# Patient Record
(demographics unavailable — no encounter records)

---

## 2019-06-04 NOTE — PDOC
BRIEF OPERATIVE NOTE


Pre-Op Diagnosis


TIUP


Labor


Post-Op Diagnosis


Same


FTP


Procedure Performed


Primary LTC/S


Surgeon


Lalito


Anesthesia Type:  Regional


Blood Loss


2L


Findings


Female


8/7/9


9#


Complications


Hemorrhage











DAVID CENTENO MD                Jun 4, 2019 15:39

## 2019-06-04 NOTE — PDOC1
OB - History


Hx of Present Pregnancy


Prenatal Care:  Good Care


Ultrasounds:  Normal mid trimester US


Obstetrical Complications:  None


Medical Complications:  None





Past Family/Social History


*


Past Medical, Surgical, Family and Obstetric Histories reviewed from prenatal 

chart.


Blood Type:  O+


Rubella:  Immune


RPR/VDRL:  Negative


GBS Status:  Negative


HBsAG:  Negative





OB - Chief Complaint & HPI


Date of Admission:


Date of Admission:  2019 at 00:02


Chief Complaint/History


:  3


Para:  2


EDC:  2019


Reason for admission:  active labor


Admission Nurse Assessment Rev:  Yes





OB - Admission Exam


Physical Exam


Vitals:





                          VS - Last 72 Hours, by Label








  Date Time  Temp Pulse Resp B/P (MAP) Pulse Ox O2 Delivery O2 Flow Rate FiO2


 


19 05:53   22   Room Air  


 


19 02:48   18   Room Air  


 


19 02:15 97.7 102 20 127/78 (94) 97 Room Air  





 97.7       








HEENT:  Normal, Nasal Mucosa Normal, Oropharynx Normal, Moist Membranes, 

Fontanelles Normal


Heart:  Regular Rate


Lungs:  Clear, Equal


Abdomen:  Gravid


Extremities:  Normal Pulses, No tenderness or swelling


Reflexes:  Normal


Cervical Dilatation:  3cm


Effacement:  75%


Station:  -2


Membranes:  Intact


Fetal Heart Rate:  Normal


Accelerations:  Accelerations Present


Short Term Variability:  Present


Contractions on Admission:  6-10 Minutes Apart


Intensity:  Moderate





Assessment/Plan


Assessment/Plan


TIUP


Labor


ACS











DAVID CENTENO MD                2019 08:44

## 2019-06-05 NOTE — PDOC
Provider Note


Provider Note


Stable


Hgb 7.7


Dressing CDI


FU in AM


Vital Sign - Last 24 Hours








 6/4/19 6/4/19 6/4/19 6/4/19





 16:42 18:45 20:15 22:13


 


Temp  98.0  98.3





  98.0  98.3


 


Pulse  114  112


 


Resp 18 18 20 18


 


B/P (MAP)  109/72 (84)  98/66 (77)


 


Pulse Ox 97 97  96


 


O2 Delivery Room Air Room Air ETCO2=33 ETCO2=33


 


    





    





 6/5/19 6/5/19 6/5/19 6/5/19





 02:00 05:10 05:12 08:06


 


Temp 98.3 98.6  





 98.3 98.6  


 


Pulse 115 117  


 


Resp 24 18 20 20


 


B/P (MAP) 89/52 (64) 99/59 (72)  


 


Pulse Ox 95 96  98


 


O2 Delivery ETCO2=35 Room Air ETCO2=34 Room Air














Intake and Output   


 


 6/4/19 6/4/19 6/5/19





 15:00 23:00 07:00


 


Intake Total   2420 ml


 


Output Total   450 ml


 


Balance   1970 ml





CBC - BMP


6/4/19 16:20








6/5/19 07:52

















DAVID CENTENO MD                Jun 5, 2019 08:25

## 2019-06-06 NOTE — PDOC
OB Progress Note


Date of Service


6/6/19


Time of Evaluation


1830


Notes


Pt. feeling well.  No complaints.


Lab





Laboratory Tests








Test


 6/5/19


07:52 6/6/19


04:30


 


White Blood Count


 13.3 x10^3/uL


(4.0-11.0) 12.6 x10^3/uL


(4.0-11.0)


 


Red Blood Count


 2.65 x10^6/uL


(3.50-5.40) 2.47 x10^6/uL


(3.50-5.40)


 


Hemoglobin


 7.7 g/dL


(12.0-15.5) 7.3 g/dL


(12.0-15.5)


 


Hematocrit


 22.8 %


(36.0-47.0) 21.4 %


(36.0-47.0)


 


Mean Corpuscular Volume 86 fL ()  87 fL () 


 


Mean Corpuscular Hemoglobin 29 pg (25-35)  29 pg (25-35) 


 


Mean Corpuscular Hemoglobin


Concent 34 g/dL


(31-37) 34 g/dL


(31-37)


 


Red Cell Distribution Width


 16.0 %


(11.5-14.5) 15.9 %


(11.5-14.5)


 


Platelet Count


 184 x10^3/uL


(140-400) 201 x10^3/uL


(140-400)


 


Neutrophils (%) (Auto) 72 % (31-73)  


 


Lymphocytes (%) (Auto) 17 % (24-48)  


 


Monocytes (%) (Auto) 11 % (0-9)  


 


Eosinophils (%) (Auto) 0 % (0-3)  


 


Basophils (%) (Auto) 0 % (0-3)  


 


Neutrophils # (Auto)


 9.5 x10^3uL


(1.8-7.7) 





 


Lymphocytes # (Auto)


 2.2 x10^3/uL


(1.0-4.8) 





 


Monocytes # (Auto)


 1.5 x10^3/uL


(0.0-1.1) 





 


Eosinophils # (Auto)


 0.1 x10^3/uL


(0.0-0.7) 





 


Basophils # (Auto)


 0.0 x10^3/uL


(0.0-0.2) 











Laboratory Tests








Test


 6/6/19


04:30


 


White Blood Count


 12.6 x10^3/uL


(4.0-11.0)


 


Red Blood Count


 2.47 x10^6/uL


(3.50-5.40)


 


Hemoglobin


 7.3 g/dL


(12.0-15.5)


 


Hematocrit


 21.4 %


(36.0-47.0)


 


Mean Corpuscular Volume 87 fL () 


 


Mean Corpuscular Hemoglobin 29 pg (25-35) 


 


Mean Corpuscular Hemoglobin


Concent 34 g/dL


(31-37)


 


Red Cell Distribution Width


 15.9 %


(11.5-14.5)


 


Platelet Count


 201 x10^3/uL


(140-400)








Medications





Current Medications


Ringer's Solution 1,000 ml @  125 mls/hr Q8H IV  Last administered on 6/5/19at 

12:25;  Start 6/4/19 at 00:15


Acetaminophen (Tylenol) 1,000 mg PRN Q6HRS  PRN PO MILD PAIN 1-3;  Start 6/4/19 

at 00:15


Sodium Chloride (Normal Saline Flush) 3 ml QSHIFT  PRN IV AFTER MEDS AND BLOOD 

DRAWS;  Start 6/4/19 at 02:15;  Stop 6/4/19 at 15:44;  Status DC


Ringer's Solution 1,000 ml @  125 mls/hr Q8H IV  Last administered on 6/4/19at 

11:00;  Start 6/4/19 at 02:15;  Stop 6/4/19 at 15:44;  Status DC


Butorphanol Tartrate (Stadol) 2 mg PRN Q1HR  PRN IV Severe labor pain Last 

administered on 6/4/19at 09:29;  Start 6/4/19 at 02:15;  Stop 6/4/19 at 15:44;  

Status DC


Fentanyl Citrate (Fentanyl 2ml Vial) 100 mcg PRN Q30MIN  PRN IV Severe pain;  

Start 6/4/19 at 02:15;  Stop 6/4/19 at 15:44;  Status DC


Fentanyl Citrate (Fentanyl 2ml Vial) 50 mcg PRN Q10MIN  PRN IV Labor pain;  

Start 6/4/19 at 02:15;  Stop 6/4/19 at 15:44;  Status DC


Ondansetron HCl (Zofran) 4 mg PRN Q4HRS  PRN IV NAUSEA/VOMITING;  Start 6/4/19 

at 02:15;  Stop 6/4/19 at 15:44;  Status DC


Citric Acid/ Sodium Citrate (Bicitra) 30 ml 1X PRN  PRN PO DYSPEPSIA;  Start 

6/4/19 at 02:15;  Stop 6/4/19 at 15:44;  Status DC


Terbutaline Sulfate (Brethine) 0.25 mg 1X PRN  PRN SQ SEE COMMENTS;  Start 

6/4/19 at 02:15;  Stop 6/4/19 at 15:44;  Status DC


Lidocaine HCl (Xylocaine 1% Pf 30ml Vial) 30 ml 1X PRN  PRN INJ SEE COMMENTS;  

Start 6/4/19 at 02:15;  Stop 6/4/19 at 15:44;  Status DC


Oxytocin/Sodium Chloride 500 ml @ 0 mls/hr CONT  PRN IV SEE I/O RECORD;  Start 

6/4/19 at 02:15;  Stop 6/4/19 at 15:44;  Status DC


Oxytocin/Sodium Chloride 500 ml @ 0 mls/hr CONT PRN  PRN IV Post delivery 

bleeding Last administered on 6/4/19at 02:48;  Start 6/4/19 at 02:15


Ibuprofen (Motrin) 800 mg PRN Q6HRS  PRN PO PAIN;  Start 6/4/19 at 02:15;  Stop 

6/4/19 at 15:38;  Status DC


Sodium Chloride (Normal Saline Flush) 10 ml QSHIFT  PRN IV AFTER MEDS AND BLOOD 

DRAWS;  Start 6/4/19 at 08:45;  Stop 6/4/19 at 15:44;  Status DC


Oxytocin/Sodium Chloride 500 ml @  62.5 mls/hr CONT  PRN IV SEE I/O RECORD;  

Start 6/4/19 at 08:45;  Stop 6/4/19 at 16:44;  Status DC


Acetaminophen (Tylenol) 650 mg PRN Q6HRS  PRN PO MILD PAIN / TEMP;  Start 6/4/19

at 08:45;  Status UNV


Ibuprofen (Motrin) 800 mg Q8HRS PO  Last administered on 6/6/19at 04:56;  Start 

6/4/19 at 14:00


Ibuprofen (Motrin) 800 mg PRN Q8HRS  PRN PO INFLAMMATION/PAIN PREVENTION;  Start

6/4/19 at 08:45;  Status UNV


Magnesium Hydroxide (Milk Of Magnesia) 2,400 mg PRN DAILY  PRN PO CONSTIPATION; 

Start 6/4/19 at 08:45;  Stop 6/4/19 at 15:44;  Status DC


Al Hydroxide/Mg Hydroxide (Mylanta Plus Xs) 30 ml PRN Q4HRS  PRN PO HEARTBURN / 

GAS;  Start 6/4/19 at 08:45


Simethicone (Gas-X) 80 mg PRN AFTMEALHC  PRN PO GAS / BLOATING Last administered

on 6/5/19at 21:58;  Start 6/4/19 at 08:45


Diphenhydramine HCl (Benadryl) 25 mg PRN Q6HRS  PRN PO ITCHING;  Start 6/4/19 at

08:45


Benzocaine (Americaine) 1 spray PRN QID  PRN TP TOPICAL PAIN;  Start 6/4/19 at 

08:45


Phenyleph/Shark Oil/Min Oil/Petrol (Preparation H) 1 stephanie PRN QID  PRN RC RECTAL 

PAIN;  Start 6/4/19 at 08:45


Hydrocortisone (Cortaid) 1 stephanie PRN QID  PRN TP PERINEAL PAIN;  Start 6/4/19 at 

08:45


Ferrous Sulfate (Feosol) 325 mg BIDWMEALS PO  Last administered on 6/6/19at 

09:59;  Start 6/4/19 at 17:00


Zolpidem Tartrate (Ambien) 5 mg PRN QHS  PRN PO INSOMNIA, MAY REPEAT X1;  Start 

6/4/19 at 08:45;  Stop 6/4/19 at 15:44;  Status DC


Info (Do NOT chart on this placeholder) 1 ea 1X PRN  PRN MC SEE COMMENTS;  Start

6/4/19 at 08:45;  Stop 6/4/19 at 15:44;  Status DC


Sodium Chloride (Normal Saline Flush) 3 ml QSHIFT  PRN IV AFTER MEDS AND BLOOD 

DRAWS;  Start 6/4/19 at 15:45


Oxytocin/Sodium Chloride 500 ml @  125 mls/hr CONT  PRN IV EXCESSIVE POST-PARTUM

BLEEDING;  Start 6/4/19 at 15:45;  Stop 6/4/19 at 23:44;  Status DC


Ibuprofen (Motrin) 800 mg Q8HRS PO ;  Start 6/4/19 at 22:00;  Status UNV


Ondansetron HCl (Zofran) 4 mg PRN Q6HRS  PRN IV NAUSEA/VOMITING;  Start 6/4/19 

at 15:45


Docusate Sodium (Colace) 100 mg PRN BID  PRN PO HARD STOOLS Last administered on

6/6/19at 09:59;  Start 6/4/19 at 15:45


Magnesium Hydroxide (Milk Of Magnesia) 2,400 mg PRN DAILY  PRN PO CONSTIPATION 

Last administered on 6/6/19at 09:59;  Start 6/4/19 at 15:45


Al Hydroxide/Mg Hydroxide (Mylanta Plus Xs) 30 ml PRN Q4HRS  PRN PO HEARTBURN / 

GAS;  Start 6/4/19 at 15:45;  Status UNV


Simethicone (Gas-X) 80 mg PRN AFTMEALHC  PRN PO GAS / BLOATING;  Start 6/4/19 at

15:45;  Status UNV


Diphenhydramine HCl (Benadryl Oral Elixir) 12.5 mg PRN Q6HRS  PRN PO ITCHING;  

Start 6/4/19 at 15:45


Ferrous Sulfate (Feosol) 325 mg BIDWMEALS PO ;  Start 6/4/19 at 17:00;  Status 

UNV


Zolpidem Tartrate (Ambien) 5 mg PRN QHS  PRN PO INSOMNIA, MAY REPEAT X1;  Start 

6/4/19 at 15:45


Info (Do NOT chart on this placeholder) 1 ea PRN 1X  PRN MC SEE COMMENTS;  Start

6/4/19 at 15:45;  Stop 6/5/19 at 18:07;  Status DC


Info (Do NOT chart on this placeholder) 1 ea PRN 1X  PRN MC SEE COMMENTS;  Start

6/4/19 at 15:45;  Stop 6/5/19 at 18:07;  Status DC


Oxycodone/ Acetaminophen (Percocet 5/325) 1 tab PRN Q4HRS  PRN PO MODERATE PAIN 

Last administered on 6/6/19at 04:57;  Start 6/4/19 at 15:45


Oxycodone/ Acetaminophen (Percocet 5/325) 2 tab PRN Q4HRS  PRN PO SEVERE PAIN 

Last administered on 6/6/19at 15:05;  Start 6/4/19 at 15:45


Cefazolin Sodium 1 gm/Dextrose 50 ml @  100 mls/hr Q8HRS IV ;  Start 6/4/19 at 

22:00;  Status UNV


Fentanyl Citrate (Fentanyl 2ml Vial) 100 mcg STK-MED ONCE .ROUTE ;  Start 6/4/19

at 15:35;  Stop 6/4/19 at 15:41;  Status DC


Ketamine HCl (Ketamine) 50 mg STK-MED ONCE .ROUTE ;  Start 6/4/19 at 15:35;  

Stop 6/4/19 at 15:41;  Status DC


Misoprostol (Cytotec 200mcg Tab) 200 mcg STK-MED ONCE .ROUTE ;  Start 6/4/19 at 

15:35;  Stop 6/4/19 at 15:41;  Status DC


Phenylephrine HCl (PHENYLEPHRINE in 0.9% NACL PF) 1 mg STK-MED ONCE IV ;  Start 

6/4/19 at 15:35;  Stop 6/4/19 at 15:41;  Status DC


Oxytocin (Pitocin) 20 unit STK-MED ONCE .ROUTE ;  Start 6/4/19 at 15:35;  Stop 

6/4/19 at 15:41;  Status DC


Midazolam HCl (Versed) 2 mg STK-MED ONCE .ROUTE ;  Start 6/4/19 at 15:35;  Stop 

6/4/19 at 15:41;  Status DC


Misoprostol (Cytotec 200mcg Tab) 200 mcg STK-MED ONCE .ROUTE ;  Start 6/4/19 at 

15:35;  Stop 6/4/19 at 15:41;  Status DC


Misoprostol (Cytotec 200mcg Tab) 200 mcg STK-MED ONCE .ROUTE ;  Start 6/4/19 at 

15:35;  Stop 6/4/19 at 15:41;  Status DC


Misoprostol (Cytotec 200mcg Tab) 200 mcg STK-MED ONCE .ROUTE ;  Start 6/4/19 at 

15:35;  Stop 6/4/19 at 15:41;  Status DC


Hydromorphone HCl 30 ml @ 0 mls/hr CONT PRN  PRN IV PER PROTOCOL Last 

administered on 6/4/19at 16:42;  Start 6/4/19 at 16:00;  Stop 6/5/19 at 18:07;  

Status DC


Cefazolin Sodium (Ancef) 1 gm Q8H IVP  Last administered on 6/5/19at 14:43;  

Start 6/4/19 at 23:00;  Stop 6/5/19 at 15:01;  Status DC


Ropivacaine (Naropin 0.2%) 10 ml STK-MED ONCE .ROUTE ;  Start 6/4/19 at 10:30;  

Stop 6/4/19 at 17:59;  Status DC


Ropivacaine/ Fentanyl/NS 50 ml @ As Directed STK-MED ONCE .ROUTE ;  Start 6/4/19

at 10:31;  Stop 6/4/19 at 17:59;  Status DC


Lidocaine HCl (Lidocaine Pf 2% Vial) 5 ml STK-MED ONCE .ROUTE ;  Start 6/4/19 at

14:38;  Stop 6/4/19 at 18:03;  Status DC


Lidocaine HCl (Lidocaine Pf 2% Vial) 5 ml STK-MED ONCE .ROUTE ;  Start 6/4/19 at

14:38;  Stop 6/4/19 at 18:03;  Status DC


Oxytocin (Pitocin) 10 unit STK-MED ONCE .ROUTE ;  Start 6/4/19 at 14:43;  Stop 

6/4/19 at 18:03;  Status DC


Oxytocin (Pitocin) 10 unit STK-MED ONCE .ROUTE ;  Start 6/4/19 at 14:43;  Stop 

6/4/19 at 18:03;  Status DC


Oxytocin (Pitocin) 10 unit STK-MED ONCE .ROUTE ;  Start 6/4/19 at 14:43;  Stop 

6/4/19 at 18:03;  Status DC


Cefazolin Sodium (Ancef) 1 gm STK-MED ONCE .ROUTE ;  Start 6/4/19 at 14:46;  

Stop 6/4/19 at 18:03;  Status DC


Cefazolin Sodium (Ancef) 1 gm STK-MED ONCE .ROUTE ;  Start 6/4/19 at 14:47;  

Stop 6/4/19 at 18:03;  Status DC


Lidocaine HCl (Lidocaine Pf 2% Vial) 5 ml STK-MED ONCE .ROUTE ;  Start 6/4/19 at

14:52;  Stop 6/4/19 at 18:03;  Status DC


Lidocaine HCl (Lidocaine Pf 2% Vial) 5 ml STK-MED ONCE .ROUTE ;  Start 6/4/19 at

14:59;  Stop 6/4/19 at 18:03;  Status DC


Fentanyl Citrate (Fentanyl 2ml Vial) 100 mcg STK-MED ONCE .ROUTE ;  Start 6/4/19

at 15:00;  Stop 6/4/19 at 18:03;  Status DC


Ketamine HCl (Ketamine) 50 mg STK-MED ONCE .ROUTE ;  Start 6/4/19 at 15:02;  

Stop 6/4/19 at 18:03;  Status DC


Misoprostol (Cytotec 200mcg Tab) 200 mcg STK-MED ONCE .ROUTE ;  Start 6/4/19 at 

15:03;  Stop 6/4/19 at 18:03;  Status DC


Misoprostol (Cytotec 200mcg Tab) 200 mcg STK-MED ONCE .ROUTE ;  Start 6/4/19 at 

15:03;  Stop 6/4/19 at 18:03;  Status DC


Misoprostol (Cytotec 200mcg Tab) 200 mcg STK-MED ONCE .ROUTE ;  Start 6/4/19 at 

15:03;  Stop 6/4/19 at 18:03;  Status DC


Misoprostol (Cytotec 200mcg Tab) 200 mcg STK-MED ONCE .ROUTE ;  Start 6/4/19 at 

15:03;  Stop 6/4/19 at 18:03;  Status DC


Phenylephrine HCl (PHENYLEPHRINE in 0.9% NACL PF) 1 mg STK-MED ONCE IV ;  Start 

6/4/19 at 15:07;  Stop 6/4/19 at 18:03;  Status DC


Oxytocin (Pitocin) 10 unit STK-MED ONCE .ROUTE ;  Start 6/4/19 at 15:21;  Stop 

6/4/19 at 18:04;  Status DC


Midazolam HCl (Versed) 2 mg STK-MED ONCE .ROUTE ;  Start 6/4/19 at 15:34;  Stop 

6/4/19 at 18:04;  Status DC


Ropivacaine/ Fentanyl/NS (Fentanyl-Ropiv-NS 3 Mcg-0.1%) 50 ml STK-MED ONCE 

.ROUTE ;  Start 6/4/19 at 10:31;  Stop 6/5/19 at 08:31;  Status DC


Ropivacaine/ Sodium Chloride (ROPIVacaine 0.2% - 0.9%NACL PF) 40 mg STK-MED ONCE

.ROUTE ;  Start 6/4/19 at 10:31;  Stop 6/5/19 at 08:31;  Status DC


Exam


Abd: soft, non tender, fundus firm


Prevena in place.


Assessment


POD#2 s/p c/s


Plan of Care:  Continue current Tx, Mgmt











FLACO MENDEZ Jr, MD           Jun 6, 2019 18:30

## 2019-06-07 NOTE — NUR
Discharge instructrions reviewed with patient and  emily Baig [place]  
Andry [id number]. Verbalizes understanding. Pt in stable condition. VSS. Up and 
about.Tolerating food and fluids. Voiding and stooling. Incision clean and dry, 
well-approximated. Pain controlled with po meds. Bonding appropriately with infant.

## 2019-06-07 NOTE — NUR
Discharged per wheelchair with infant. Accompanied by  and daughters. Escorted to car 
by nursing personnel.

## 2019-06-08 NOTE — OP
DATE OF SURGERY:  2019



PREOPERATIVE DIAGNOSIS:  Term intrauterine pregnancy, failure to progress.



POSTOPERATIVE DIAGNOSIS:  Term intrauterine pregnancy, failure to progress.



PROCEDURE:  Primary low transverse .



SURGEON:  Manuel Starkey M.D.



ASSISTANT:  None.



ANESTHESIA:  Regional.



ESTIMATED BLOOD LOSS:  2 liters.



FINDINGS:  Female infant, Apgars 8, 7 and 9, weight is 9 pounds.



COMPLICATIONS:  Hemorrhage.



CONDITION:  Stable.



DESCRIPTION OF PROCEDURE:  Risks, benefits, indications and alternatives

discussed in detail with the patient.  The patient was brought to OR theater,

placed in supine position with left lateral uterine displacement.  After

adequate regional anesthesia, the patient was prepped and draped in usual

sterile manner.  A low transverse Pfannenstiel incision was made sharply with a

Bovie cautery, carried down through subcutaneous tissue with Bovie cautery. 

Rectus fascia was nicked in midline and extended laterally in each direction

with Bovie cautery.  Upper edge of rectus fascia was grasped x 2 with Kocher

clamps, elevated above the rectus muscle,  both bluntly and sharply

with Bovie cautery.  The same procedure was carried out on lower edge of rectus

fascia.  Rectus muscle was split in midline and extended superiorly and

inferiorly with Bovie cautery.  Parietal peritoneum was entered bluntly with

gloved hand.  With gentle stretch on rectus muscle, room was made for delivery

of the infant.  Shelton retractor was placed in the pelvic cavity.  Sponges were

placed bilaterally in the gutters.  A low transverse hysterotomy incision was

made sharply with a scalpel with care not to injure any underlying fetal

structures.  With fundal pressure from the assistant, it was difficult to

deliver the baby, vacuum device was used placed in the baby's head and with

fundal pressure, infant was delivered on anterior abdominal wall without any

difficulties.  The infant cried spontaneously and moved all extremities.  Cord

was doubly clamped, transected cord between 2 clamps.  The infant was handed to

nursing care in attendance.  Cord blood samples were taken.  Placenta delivered

spontaneously intact, 3-vessel cord noted.  A left lower uterine segment

vertical extension of the uterine incision secondary to the size of the infant

and the bleeding was controlled by clamping the vessel between the 2 fingers and

reapproximating the lower cervical laceration.  The remaining of the repair was

done in the usual fashion.  The hysterotomy incision was reapproximated with 0

Monocryl in a running locking manner, imbricated with 0 Monocryl in vertical

mattress stitch fashion.  Bladder flap was reapproximated with 3-0 Vicryl in a

running manner.  Sponges were removed from the gutters.  No debris was noted. 

The Shelton retractor was removed.  Lower uterine segment was inspected and noted

to be free of any bleeding and hemostatic.  Parietal peritoneum was

reapproximated with 3-0 Vicryl in a running manner, this reapproximated rectus

muscle in the midline.  Areas of bleeding were also controlled with Bovie

cautery.  Judd fascia was reapproximated with 2-0 plain.  The skin was

reapproximated with Insorb staples.  Sponge and needle counts were correct x 2.

 



______________________________

MANUEL STARKEY MD



DR:  [email or web address]  JOB#:  [id number] / [id number]

DD:  2019 16:14  DT:  2019 20:27